# Patient Record
Sex: FEMALE | Race: BLACK OR AFRICAN AMERICAN
[De-identification: names, ages, dates, MRNs, and addresses within clinical notes are randomized per-mention and may not be internally consistent; named-entity substitution may affect disease eponyms.]

---

## 2017-03-13 NOTE — EMERGENCY ROOM REPORT
History of Present Illness


General


Chief Complaint:  Abdominal Pain


Source:  Patient





Present Illness


HPI


This patient states that she has had recurrent epigastric pain and recurrent 

vomiting for the past 2 years.  She states that she has been diagnosed with 

gastritis and that she may have H. pylori.  She is planned to undergo an upper 

endoscopy in 2 days.  She states that she developed an exacerbation of her 

gastritis and vomiting last night.  She states that she has epigastric pain and 

recurrent vomiting.  She states that Zofran used to work for her that does not 

work anymore.  She does request viscous lidocaine.  She denies change in her 

symptoms.  She denies fever or chills.  She has no other complaints.


Allergies:  


Coded Allergies:  


     No Known Allergies (Unverified , 8/30/14)





Patient History


Past Medical History:  see triage record, GERD, other - Recurrent UTI, gastritis


Past Surgical History:  none


Social History:  Reports: drug use - THC, Denies: alcohol use, smoking


Last Menstrual Period:  1/30/17


Pregnant Now:  No - irregular cycle


Reviewed Nursing Documentation:  PMH: Agreed, PSxH: Agreed





Nursing Documentation-PMH


Past Medical History:  No Stated History


Hx Cardiac Problems:  No


Hx Cancer:  No


Hx Gastrointestinal Problems:  Yes - NAUSEA/VOMITTING


Hx Neurological Problems:  No





Review of Systems


All Other Systems:  negative except mentioned in HPI





Physical Exam





Vital Signs








  Date Time  Temp Pulse Resp B/P Pulse Ox O2 Delivery O2 Flow Rate FiO2


 


3/13/17 09:39  96 18 91/64 100 Room Air  


 


3/13/17 10:01 97.3       








Sp02 EP Interpretation:  reviewed, normal


General Appearance:  no apparent distress, alert, GCS 15, non-toxic


Head:  normocephalic, atraumatic


Eyes:  bilateral eye PERRL, bilateral eye normal inspection


ENT:  hearing grossly normal, normal pharynx, no angioedema, normal voice


Neck:  full range of motion, supple/symm/no masses


Respiratory:  chest non-tender, lungs clear, normal breath sounds, speaking 

full sentences


Cardiovascular #1:  regular rate, rhythm, no edema


Gastrointestinal:  normal bowel sounds, soft, non-distended, no guarding, no 

rebound, tenderness - epigastrium


Rectal:  deferred


Musculoskeletal:  back normal, gait/station normal, normal range of motion, non-

tender


Neurologic:  alert, oriented x3, responsive, motor strength/tone normal, 

sensory intact, speech normal


Psychiatric:  judgement/insight normal, memory normal, mood/affect normal, no 

suicidal/homicidal ideation


Skin:  normal color, no rash, warm/dry, well hydrated





Medical Decision Making


Diagnostic Impression:  


 Primary Impression:  


 Gastritis


 Additional Impressions:  


 UTI (urinary tract infection)


 Recurrent UTI


ER Course


This patient has a clinical presentation consistent with gastritis.  The 

location of the pain and history and physical examination is consistent with 

this.  I considered other concerning differentials, to include appendicitis, 

cholelithiasis, cholecystitis, pancreatitis, perforated viscus, aortic aneurysm

, and pyelonephritis to name a few.  However, laboratory workup in combination 

with medical and surgical history and physical exam makes these unlikely at 

this time.





Patient is also found to have a urinary tract infection.  Patient has a history 

of recurrent UTI.  However, when I did review urine culture, there was only 

mixed growth.  Over ahead and treat the patient with a course of antibiotics.  

Patient was instructed to followup with a urologist given the recurrent UTIs.  

She indicated understanding.





I did educate the patient on close return precautions and followup instructions.





Labs








Test


  3/13/17


08:30 3/13/17


10:30


 


Urine Color Yellow  


 


Urine Appearance Turbid  


 


Urine pH 7 (4.5-8.0)  


 


Urine Specific Gravity


  1.010


(1.005-1.035) 


 


 


Urine Protein 2+ (NEGATIVE)  


 


Urine Glucose (UA)


  Negative


(NEGATIVE) 


 


 


Urine Ketones 4+ (NEGATIVE)  


 


Urine Occult Blood


  Negative


(NEGATIVE) 


 


 


Urine Nitrite


  Negative


(NEGATIVE) 


 


 


Urine Bilirubin


  Negative


(NEGATIVE) 


 


 


Urine Urobilinogen


  1 MG/DL


(0.0-1.0) 


 


 


Urine Leukocyte Esterase 2+ (NEGATIVE)  


 


Urine RBC


  0-2 /HPF (0 -


2) 


 


 


Urine WBC


  5-10 /HPF (0 -


2) 


 


 


Urine Squamous Epithelial


Cells Moderate /LPF


(NONE/OCC) 


 


 


Urine Bacteria


  Moderate /HPF


(NONE) 


 


 


Urine HCG, Qualitative Negative  


 


White Blood Count


  


  7.2 K/UL


(4.8-10.8)


 


Red Blood Count


  


  5.13 M/UL


(4.20-5.40)


 


Hemoglobin


  


  16.2 G/DL


(12.0-16.0)


 


Hematocrit


  


  46.7 %


(37.0-47.0)


 


Mean Corpuscular Volume  91 FL (80-99) 


 


Mean Corpuscular Hemoglobin


  


  31.6 PG


(27.0-31.0)


 


Mean Corpuscular Hemoglobin


Concent 


  34.7 G/DL


(32.0-36.0)


 


Red Cell Distribution Width


  


  10.6 %


(11.6-14.8)


 


Platelet Count


  


  292 K/UL


(150-450)


 


Mean Platelet Volume


  


  5.3 FL


(6.5-10.1)


 


Neutrophils (%) (Auto)


  


  71.7 %


(45.0-75.0)


 


Lymphocytes (%) (Auto)


  


  19.9 %


(20.0-45.0)


 


Monocytes (%) (Auto)


  


  7.2 %


(1.0-10.0)


 


Eosinophils (%) (Auto)


  


  0.1 %


(0.0-3.0)


 


Basophils (%) (Auto)


  


  1.1 %


(0.0-2.0)


 


Sodium Level


  


  138 mEQ/L


(135-145)


 


Potassium Level


  


  3.8 mEQ/L


(3.4-4.9)


 


Chloride Level


  


  96 mEQ/L


()


 


Carbon Dioxide Level


  


  27 mEQ/L


(20-30)


 


Anion Gap  15 (5-15) 


 


Blood Urea Nitrogen


  


  10 mg/dL


(7-23)


 


Creatinine


  


  0.8 mg/dL


(0.5-0.9)


 


Estimat Glomerular Filtration


Rate 


  > 60 mL/min


(>60)


 


Glucose Level


  


  97 mg/dL


()


 


Calcium Level


  


  9.0 mg/dL


(8.6-10.2)


 


Total Bilirubin


  


  0.7 mg/dL


(0.0-1.2)


 


Aspartate Amino Transf


(AST/SGOT) 


  24 U/L (5-40) 


 


 


Alanine Aminotransferase


(ALT/SGPT) 


  29 U/L (3-33) 


 


 


Alkaline Phosphatase


  


  59 U/L


()


 


Total Protein


  


  7.5 g/dL


(6.6-8.7)


 


Albumin


  


  4.4 g/dL


(3.5-5.2)


 


Globulin  3.1 g/dL 


 


Albumin/Globulin Ratio  1.4 (1.0-2.7) 


 


Lipase  19 U/L (< 60) 











Last Vital Signs








  Date Time  Temp Pulse Resp B/P Pulse Ox O2 Delivery O2 Flow Rate FiO2


 


3/13/17 12:14  67 18 160/103 100 Room Air  


 


3/13/17 10:01 97.3       








Status:  improved


Disposition:  HOME, SELF-CARE


Condition:  Improved


Referrals:  


[facility],REFERRING (PCP)











PANDA ERVIN D.O. Mar 13, 2017 14:00

## 2017-06-10 NOTE — EMERGENCY ROOM REPORT
History of Present Illness


General


Chief Complaint:  Female Urogenital Problems


Source:  Patient





Present Illness


HPI


21 y/o female c/o multiple complaints. States she has dysuria and increased 

frequency x 2 days. States she thinks she is also having yeast infection x 3 

days. Assoc sxs include white non-odorous cream discharge that has vaginal 

itching and appears like cottage cheese. No provoking or relieving factors. Has 

no other physical complaints. Has not tried OTC medications.


Allergies:  


Coded Allergies:  


     No Known Allergies (Unverified , 8/30/14)





Patient History


Past Medical History:  see triage record


Past Surgical History:  none


Pertinent Family History:  none


Last Menstrual Period:  05/24/17


Pregnant Now:  No


Immunizations:  UTD


Reviewed Nursing Documentation:  PMH: Agreed, PSxH: Agreed





Nursing Documentation-PMH


Past Medical History:  No Stated History


Hx Cardiac Problems:  No


Hx Cancer:  No


Hx Gastrointestinal Problems:  Yes - NAUSEA/VOMITTING


Hx Neurological Problems:  No





Review of Systems


All Other Systems:  negative except mentioned in HPI





Physical Exam





Vital Signs








  Date Time  Temp Pulse Resp B/P Pulse Ox O2 Delivery O2 Flow Rate FiO2


 


6/10/17 19:07 98.2 83 16 117/70 100 Room Air  








Sp02 EP Interpretation:  reviewed, normal


General Appearance:  no apparent distress, alert, GCS 15, non-toxic


Head:  normocephalic, atraumatic


Eyes:  bilateral eye PERRL, bilateral eye normal inspection


ENT:  normal ENT inspection


Neck:  full range of motion, supple/symm/no masses


Respiratory:  chest non-tender, lungs clear, normal breath sounds, speaking 

full sentences


Cardiovascular #1:  regular rate, rhythm, no edema


Gastrointestinal:  non tender, soft


Genitourinary:  no CVA tenderness


Musculoskeletal:  back normal, gait/station normal


Neurologic:  alert, oriented x3, responsive, motor strength/tone normal, 

sensory intact, speech normal


Psychiatric:  judgement/insight normal, memory normal, mood/affect normal, no 

suicidal/homicidal ideation


Skin:  normal color, no rash, warm/dry, well hydrated


Lymphatic:  no adenopathy





Medical Decision Making


PA Attestation


Dr. Quach my supervising physician with whom patient management has been 

discussed with.


Diagnostic Impression:  


 Primary Impression:  


 UTI (urinary tract infection)


 Qualified Codes:  N30.00 - Acute cystitis without hematuria


 Additional Impression:  


 Candidal vaginitis


ER Course


Pt. presents to the ED c/o UTI


Ddx considered but are not limited to UTI, STI, Kidney Stone, Pyelonephritis, 

Vaginitis


Vital signs: are WNL, pt. is afebrile 


H&PE are most consistent with UTI with candida vaginitis


ORDERS: UA, Preg


ED INTERVENTIONS:  none required at this time.


DISCHARGE: At this time pt. is stable for d/c to home. Will provide printed 

patient care instructions, and any necessary prescriptions. Care plan and 

follow up instructions have been discussed with the patient prior to discharge.





Laboratory Tests








Test


  6/10/17


19:46


 


Urine Color Pale yellow  


 


Urine Appearance Clear  


 


Urine pH 6.5 (4.5-8.0)  


 


Urine Specific Gravity


  1.015


(1.005-1.035)


 


Urine Protein


  Negative


(NEGATIVE)


 


Urine Glucose (UA)


  Negative


(NEGATIVE)


 


Urine Ketones


  Negative


(NEGATIVE)


 


Urine Occult Blood


  Negative


(NEGATIVE)


 


Urine Nitrite


  Negative


(NEGATIVE)


 


Urine Bilirubin


  Negative


(NEGATIVE)


 


Urine Urobilinogen


  Normal MG/DL


(0.0-1.0)


 


Urine Leukocyte Esterase


  2+ (NEGATIVE)


H


 


Urine RBC


  0 /HPF (0 - 2)


 


 


Urine WBC


  5-10 /HPF (0 -


2)  H


 


Urine Squamous Epithelial


Cells Few /LPF


(NONE/OCC)


 


Urine Bacteria


  Few /HPF


(NONE)


 


Urine HCG, Qualitative Negative  








Chest X-Ray Diagnostic Results


Chest X-Ray Ordered:  No





Last Vital Signs








  Date Time  Temp Pulse Resp B/P Pulse Ox O2 Delivery O2 Flow Rate FiO2


 


6/10/17 19:07 98.2 83 16 117/70 100 Room Air  








Status:  unchanged


Disposition:  HOME, SELF-CARE


Condition:  Stable


Scripts


Fluconazole (FLUCONAZOLE) 100 Mg Tablet


150 MG ORAL DAILY, #2 TAB 0 Refills


   Take 1 tab today. Repeate in 1 week as needed


   Prov: REY HERNANDEZ P.A.         6/10/17 


Nitrofurantoin Monohyd/M-Cryst* (MACROBID 100 MG*) 100 Mg Capsule


100 MG ORAL EVERY 12 HOURS for 7 Days, #14 CAP


   Prov: REY HERNANDEZ P.A.         6/10/17


Referrals:  


NON PHYSICIAN (PCP)


Patient Instructions:  Vaginal Yeast Infection, Adult, Urinary Tract Infection





Additional Instructions:  


Take medication as directed. Patient informed that Azo (Pyridium) will make 

your urine turn orange and to use for urinary pain. Drink plenty of cranberry 

juice and have some pelvic rest. Return to clinic or PCP sooner if symptoms 

worsen or do not improve. Go to ER if you experience any adverse reactions to 

medication or if you start developing back pain or fever.











REY HERNANDEZ. Lee 10, 2017 20:13

## 2018-09-16 NOTE — EMERGENCY ROOM REPORT
History of Present Illness


General


Chief Complaint:  To Be Triaged


Source:  Patient





Present Illness


HPI


Patient presents complaining of dehydration.  She has taken 3 pregnancy tests 

that appeared to be positive.  Her last menstruation was on the 22nd of last 

month.  She denies any vaginal bleeding or abdominal pain.  She's nauseated and 

having difficulty keeping down fluids.  She denies any fevers or chills.  There 

is no cough chest pain shortness of breath.  Some dizziness when standing for a 

while.  No dysuria.





No prior pregnancies.  No prenatal vits.





No rashes, joint pain, headaches, edema.





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





UTI in past.


Allergies:  


Coded Allergies:  


     No Known Allergies (Unverified , 8/30/14)





Patient History


Past Medical History:  see triage record


Past Surgical History:  other - cataract removal


Social History:  Reports: smoking


Social History Narrative


with friend


Reviewed Nursing Documentation:  PMH: Agreed; PSxH: Agreed





Nursing Documentation-PMH


Hx Cardiac Problems:  No


Hx Cancer:  No


Hx Gastrointestinal Problems:  Yes - NAUSEA/VOMITTING


Hx Neurological Problems:  No





Review of Systems


All Other Systems:  negative except mentioned in HPI





Physical Exam





Vital Signs








  Date Time  Temp Pulse Resp B/P (MAP) Pulse Ox O2 Delivery O2 Flow Rate FiO2


 


9/16/18 20:55 98.5 100 16 120/81 97 Room Air  





 98.4       








Sp02 EP Interpretation:  reviewed, normal


General Appearance:  well appearing, no apparent distress, GCS 15


Head:  normocephalic


Eyes:  bilateral eye PERRL, bilateral eye other - Disconjugate gaze


ENT:  moist mucus membranes


Neck:  supple


Respiratory:  lungs clear, normal breath sounds


Cardiovascular #1:  regular rate, rhythm


Cardiovascular #2:  2+ radial (R)


Gastrointestinal:  normal inspection, normal bowel sounds, non tender, no mass, 

non-distended, scaphoid


Genitourinary:  no CVA tenderness


Musculoskeletal:  back normal, gait/station normal, normal range of motion


Neurologic:  alert, oriented x3, motor strength/tone normal, grossly normal


Psychiatric:  mood/affect normal


Skin:  normal inspection, warm/dry





Medical Decision Making


Diagnostic Impression:  


 Primary Impression:  


 Dehydration


 Additional Impressions:  


 Early stage of pregnancy


 UTI (urinary tract infection)


 Qualified Codes:  N30.00 - Acute cystitis without hematuria


ER Course


Patient presents with inability keep down fluids and believes that she is 

pregnant.  Differential includes hyperemesis gravidarum, gastroenteritis, GERD, 

gastritis amongst others.  Patient be evaluated with labs.  She'll be treated 

with Reglan, Benadryl Benadryl and IV hydration.





Labs with elevated H/H.  WBC normal.  Slight elevation bili and AST.  Sodium 

slightly low and elevated BUN.  Pyuria.  Quant 1368.





Improved with treatment.  VS improved and tolerating PO fluids.





Discussed results and treatment plan.





Patient stable for outpatient observation and treatment.





Laboratory Tests








Test


  9/16/18


21:20


 


White Blood Count


  8.2 K/UL


(4.8-10.8)


 


Red Blood Count


  5.46 M/UL


(4.20-5.40)  H


 


Hemoglobin


  18.0 G/DL


(12.0-16.0)  H


 


Hematocrit


  48.7 %


(37.0-47.0)  H


 


Mean Corpuscular Volume 90 FL (80-99)  


 


Mean Corpuscular Hemoglobin


  33.1 PG


(27.0-31.0)  H


 


Mean Corpuscular Hemoglobin


Concent 36.7 G/DL


(32.0-36.0)  H


 


Red Cell Distribution Width


  9.8 %


(11.6-14.8)  L


 


Platelet Count


  354 K/UL


(150-450)


 


Mean Platelet Volume


  5.0 FL


(6.5-10.1)  L


 


Neutrophils (%) (Auto)


  63.2 %


(45.0-75.0)


 


Lymphocytes (%) (Auto)


  29.0 %


(20.0-45.0)


 


Monocytes (%) (Auto)


  6.9 %


(1.0-10.0)


 


Eosinophils (%) (Auto)


  0.4 %


(0.0-3.0)


 


Basophils (%) (Auto)


  0.6 %


(0.0-2.0)


 


Urine Color Patricia  


 


Urine Appearance Cloudy  


 


Urine pH 6 (4.5-8.0)  


 


Urine Specific Gravity


  1.020


(1.005-1.035)


 


Urine Protein


  2+ (NEGATIVE)


H


 


Urine Glucose (UA)


  Negative


(NEGATIVE)


 


Urine Ketones


  4+ (NEGATIVE)


H


 


Urine Blood


  Negative


(NEGATIVE)


 


Urine Nitrite


  Negative


(NEGATIVE)


 


Urine Bilirubin


  1+ (NEGATIVE)


H


 


Urine Ictotest


  Negative


(NEGATIVE)


 


Urine Urobilinogen


  8 MG/DL


(0.0-1.0)  H


 


Urine Leukocyte Esterase


  2+ (NEGATIVE)


H


 


Urine RBC


  0-2 /HPF (0 -


2)


 


Urine WBC


  15-20 /HPF (0


- 2)  H


 


Urine Squamous Epithelial


Cells Many /LPF


(NONE/OCC)  H


 


Urine Bacteria


  Moderate /HPF


(NONE)  H


 


Sodium Level


  130 MMOL/L


(136-145)  L


 


Potassium Level


  4.7 MMOL/L


(3.5-5.1)


 


Chloride Level


  93 MMOL/L


()  L


 


Carbon Dioxide Level


  26 MMOL/L


(21-32)


 


Anion Gap


  11 mmol/L


(5-15)


 


Blood Urea Nitrogen


  19 mg/dL


(7-18)  H


 


Creatinine


  0.8 MG/DL


(0.55-1.30)


 


Estimate Glomerular


Filtration Rate > 60 mL/min


(>60)


 


Glucose Level


  96 MG/DL


()


 


Calcium Level


  10.0 MG/DL


(8.5-10.1)


 


Total Bilirubin


  1.6 MG/DL


(0.2-1.0)  H


 


Direct Bilirubin


  0.2 MG/DL


(0.0-0.3)


 


Aspartate Amino Transferase


(AST) 42 U/L (15-37)


H


 


Alanine Aminotransferase (ALT)


  43 U/L (12-78)


 


 


Alkaline Phosphatase


  65 U/L


()


 


Total Protein


  9.1 G/DL


(6.4-8.2)  H


 


Albumin


  4.6 G/DL


(3.4-5.0)


 


Globulin 4.5 g/dL  


 


Albumin/Globulin Ratio 1.0 (1.0-2.7)  


 


Lipase


  132 U/L


()


 


Human Chorionic Gonadotropin,


Quant 1368 mIU/mL


(1-6)  H











Last Vital Signs








  Date Time  Temp Pulse Resp B/P (MAP) Pulse Ox O2 Delivery O2 Flow Rate FiO2


 


9/17/18 00:04 98.5 78 18 111/78 98 Room Air  





 98.5       








Status:  improved


Disposition:  HOME, SELF-CARE


Condition:  Improved


Scripts


Pnv Cmb#21/Iron/Folic Acid (PRENATAL COMPLETE CAPLET) 1 Each Tablet


1 EACH PO DAILY, #30 TAB


   Prov: Jake Lanza M.D.         9/16/18 


Promethazine Hcl* (PHENERGAN*) 25 Mg Tablet


25 MG ORAL Q8HR PRN for Nausea & Vomiting, #10 TAB 1 Refill


   Prov: Jake Lanza M.D.         9/16/18 


Promethazine HCl (Promethegan) 25 Mg Supp.rect


25 MG RECTAL Q8HR PRN for Nausea & Vomiting, #3 SUPP 2 Refills


   Prov: Jake Lanza M.D.         9/16/18 


Nitrofurantoin Monohyd/M-Cryst* (MACROBID 100 MG*) 100 Mg Capsule


100 MG ORAL EVERY 12 HOURS, #14 CAP


   Prov: Jake Lanza M.D.         9/16/18











Jake Lanza M.D. Sep 16, 2018 20:55

## 2020-03-22 ENCOUNTER — HOSPITAL ENCOUNTER (EMERGENCY)
Dept: HOSPITAL 72 - EMR | Age: 25
Discharge: HOME | End: 2020-03-22
Payer: MEDICAID

## 2020-03-22 VITALS — DIASTOLIC BLOOD PRESSURE: 74 MMHG | SYSTOLIC BLOOD PRESSURE: 110 MMHG

## 2020-03-22 VITALS — HEIGHT: 63 IN | BODY MASS INDEX: 26.58 KG/M2 | WEIGHT: 150 LBS

## 2020-03-22 DIAGNOSIS — S93.402A: Primary | ICD-10-CM
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
## 2020-03-22 PROCEDURE — 73610 X-RAY EXAM OF ANKLE: CPT

## 2020-03-22 PROCEDURE — 99284 EMERGENCY DEPT VISIT MOD MDM: CPT

## 2020-03-22 PROCEDURE — 73630 X-RAY EXAM OF FOOT: CPT

## 2020-03-22 NOTE — DIAGNOSTIC IMAGING REPORT
EXAM:

  XR Left Foot Complete, 3 or More Views

 

CLINICAL HISTORY:

  PAIN

 

TECHNIQUE:

  Frontal, lateral and oblique views of the left foot.

 

COMPARISON:

  No relevant prior studies available.

 

FINDINGS:

  Bones/joints:  Unremarkable.  No acute fracture.  No dislocation.

  Soft tissues:  Unremarkable.  No radiopaque foreign body.

 

IMPRESSION:     

  Normal left foot x-rays.

## 2020-03-22 NOTE — EMERGENCY ROOM REPORT
History of Present Illness


General


Chief Complaint:  Lower Extremity Injury


Source:  Patient





Present Illness


HPI


25-year-old female presents ED complaining of left ankle pain.  States she 

rolled her ankle today while she missed a step at home.  Pain is throbbing, 7 

out of 10, nonradiating.  Denies any other injuries.  Is able to bear weight 

but with pain.  No other aggravating relieving factors.  Denies any other 

associated symptoms


COVID-19 risk:Contact w/high r:  No


COVID-19 risk:Travel to affect:  No


Has patient experienced corona:  No


Allergies:  


Coded Allergies:  


     No Known Allergies (Unverified , 8/30/14)





Patient History


Past Surgical History:  none


Pertinent Family History:  none


Social History:  Denies: smoking, alcohol use, drug use


Last Menstrual Period:  PT. IS BREAST FEEDING (AUG 2018)


Pregnant Now:  No


Immunizations:  UTD


Reviewed Nursing Documentation:  PMH: Agreed; PSxH: Agreed





Nursing Documentation-PMH


Hx Cardiac Problems:  No - BLIND IN THE RIGHT EYE DUE TO RETINAL DETACHMENT


Hx Cancer:  No


Hx Gastrointestinal Problems:  Yes - NAUSEA/VOMITTING


Hx Neurological Problems:  No





Review of Systems


All Other Systems:  negative except mentioned in HPI





Physical Exam





Vital Signs








  Date Time  Temp Pulse Resp B/P (MAP) Pulse Ox O2 Delivery O2 Flow Rate FiO2


 


3/22/20 11:14 97.9 87 19 110/74 (86) 97 Room Air  








Sp02 EP Interpretation:  reviewed, normal


General Appearance:  no apparent distress, alert, GCS 15, non-toxic


Head:  normocephalic, atraumatic


Eyes:  bilateral eye normal inspection, bilateral eye PERRL


ENT:  hearing grossly normal, normal pharynx, no angioedema, normal voice


Neck:  full range of motion, supple/symm/no masses


Respiratory:  chest non-tender, lungs clear, normal breath sounds, speaking 

full sentences


Cardiovascular #1:  regular rate, rhythm, no edema


Cardiovascular #2:  2+ carotid (R), 2+ carotid (L), 2+ radial (R), 2+ radial (L)

, 2+ dorsalis pedis (R), 2+ dorsalis pedis (L)


Gastrointestinal:  normal bowel sounds, non tender, soft, non-distended, no 

guarding, no rebound


Rectal:  deferred


Genitourinary:  normal inspection, no CVA tenderness


Musculoskeletal:  back normal, normal range of motion, gait/station normal, 

tender - L foot dorsum


Neurologic:  alert, motor strength/tone normal, oriented x3, sensory intact, 

responsive, speech normal


Psychiatric:  judgement/insight normal, memory normal, mood/affect normal, no 

suicidal/homicidal ideation


Reflexes:  3+ bicep (R), 3+ bicep (L), 3+ tricep (R), 3+ tricep (L), 3+ knee (R)

, 3+ knee (L)


Lymphatic:  no adenopathy





Medical Decision Making


Diagnostic Impression:  


 Primary Impression:  


 Ankle sprain


 Qualified Codes:  S93.402A - Sprain of unspecified ligament of left ankle, 

initial encounter


ER Course


Hospital Course 


26 yo F presents to ED c/o L ankle/foot pain s/p fall





Differential diagnoses include: Fracture, dislocation, sprain, contusion





Clinical course


Patient placed on stretcher.  After initial history and physical, I ordered 

Xrays of L foot/ankle 





Xrays read shows no acute fracture/dislocation.  placed in ace wrap, given 

crutches 





I discussed findings with patient.  Will discharge to home.  Safe for discharge 

and close outpatient follow-up.  I will provide Ortho referral





Diagnosis - ankle sprain 





Stable and discharged to home with prescription for Tylenol.  apply ice, keep 

elevated.  weight bear as tolerated.  Followup with PMD/ortho.  Return to ED if 

symptoms recur or worsen


Other X-Ray Diagnostic Results


Other X-Ray Diagnostic Results #1:  


   X-Ray ordered:  L ankle


   # of Views/Limited Vs Complete:  3 View


   Indication:  Pain


   EP Interpretation:  Yes


   Interpretation:  no dislocation, no soft tissue swelling, no fractures


   Impression:  No acute disease


   Electronically Signed by:  Electronically signed by Joseluis Quach MD


Other X-Ray Diagnostic Results #2:  


   X-Ray ordered:  L foot


   # of Views/Limited Vs Complete:  3 View


   Indication:  Pain


   EP Interpretation:  Yes


   Interpretation:  no dislocation, no soft tissue swelling, no fractures


   Impression:  No acute disease


   Electronically Signed by:  Electronically signed by Joseluis Quach MD





Last Vital Signs








  Date Time  Temp Pulse Resp B/P (MAP) Pulse Ox O2 Delivery O2 Flow Rate FiO2


 


3/22/20 11:14 97.9 87 19 110/74 (86) 97 Room Air  








Status:  improved


Disposition:  HOME, SELF-CARE


Condition:  Stable


Scripts


Acetaminophen* (TYLENOL EXTRA STRENGTH*) 500 Mg Tablet


500 MG ORAL Q8H PRN for Prn Headache/Temp > 101, #30 TAB 0 Refills


   Prov: Joseluis Quach MD         3/22/20


Referrals:  


NON PHYSICIAN (PCP)











Joseluis Quach MD Mar 22, 2020 11:48

## 2020-03-22 NOTE — DIAGNOSTIC IMAGING REPORT
EXAM:

  XR Left Ankle Complete, 3 or More Views

 

CLINICAL HISTORY:

  PAIN

 

TECHNIQUE:

  Frontal, lateral and oblique views of the left ankle.

 

COMPARISON:

  No relevant prior studies available.

 

FINDINGS:

  Bones/joints:  Unremarkable.  No acute fracture.  No dislocation.

  Soft tissues:  Unremarkable.

 

IMPRESSION:     

  Normal left ankle x-rays.